# Patient Record
Sex: MALE | Race: OTHER | HISPANIC OR LATINO
[De-identification: names, ages, dates, MRNs, and addresses within clinical notes are randomized per-mention and may not be internally consistent; named-entity substitution may affect disease eponyms.]

---

## 2019-06-23 ENCOUNTER — TRANSCRIPTION ENCOUNTER (OUTPATIENT)
Age: 59
End: 2019-06-23

## 2019-06-24 ENCOUNTER — TRANSCRIPTION ENCOUNTER (OUTPATIENT)
Age: 59
End: 2019-06-24

## 2019-06-24 ENCOUNTER — INPATIENT (INPATIENT)
Facility: HOSPITAL | Age: 59
LOS: 0 days | Discharge: HOME | DRG: 661 | End: 2019-06-24
Attending: UROLOGY | Admitting: UROLOGY
Payer: SELF-PAY

## 2019-06-24 VITALS
RESPIRATION RATE: 16 BRPM | SYSTOLIC BLOOD PRESSURE: 124 MMHG | DIASTOLIC BLOOD PRESSURE: 65 MMHG | HEART RATE: 66 BPM | TEMPERATURE: 98 F | OXYGEN SATURATION: 99 %

## 2019-06-24 VITALS
TEMPERATURE: 98 F | SYSTOLIC BLOOD PRESSURE: 169 MMHG | DIASTOLIC BLOOD PRESSURE: 98 MMHG | OXYGEN SATURATION: 98 % | HEART RATE: 68 BPM | WEIGHT: 173.06 LBS | RESPIRATION RATE: 18 BRPM

## 2019-06-24 DIAGNOSIS — N13.2 HYDRONEPHROSIS WITH RENAL AND URETERAL CALCULOUS OBSTRUCTION: ICD-10-CM

## 2019-06-24 LAB
ALBUMIN SERPL ELPH-MCNC: 4.3 G/DL — SIGNIFICANT CHANGE UP (ref 3.5–5)
ALP SERPL-CCNC: 67 U/L — SIGNIFICANT CHANGE UP (ref 40–120)
ALT FLD-CCNC: 39 U/L DA — SIGNIFICANT CHANGE UP (ref 10–60)
ANION GAP SERPL CALC-SCNC: 6 MMOL/L — SIGNIFICANT CHANGE UP (ref 5–17)
APPEARANCE UR: CLEAR — SIGNIFICANT CHANGE UP
AST SERPL-CCNC: 26 U/L — SIGNIFICANT CHANGE UP (ref 10–40)
BASOPHILS # BLD AUTO: 0.03 K/UL — SIGNIFICANT CHANGE UP (ref 0–0.2)
BASOPHILS NFR BLD AUTO: 0.2 % — SIGNIFICANT CHANGE UP (ref 0–2)
BILIRUB SERPL-MCNC: 0.5 MG/DL — SIGNIFICANT CHANGE UP (ref 0.2–1.2)
BILIRUB UR-MCNC: NEGATIVE — SIGNIFICANT CHANGE UP
BLD GP AB SCN SERPL QL: SIGNIFICANT CHANGE UP
BUN SERPL-MCNC: 27 MG/DL — HIGH (ref 7–18)
CALCIUM SERPL-MCNC: 8.6 MG/DL — SIGNIFICANT CHANGE UP (ref 8.4–10.5)
CHLORIDE SERPL-SCNC: 107 MMOL/L — SIGNIFICANT CHANGE UP (ref 96–108)
CO2 SERPL-SCNC: 29 MMOL/L — SIGNIFICANT CHANGE UP (ref 22–31)
COLOR SPEC: YELLOW — SIGNIFICANT CHANGE UP
CREAT SERPL-MCNC: 1.22 MG/DL — SIGNIFICANT CHANGE UP (ref 0.5–1.3)
DIFF PNL FLD: NEGATIVE — SIGNIFICANT CHANGE UP
EOSINOPHIL # BLD AUTO: 0.02 K/UL — SIGNIFICANT CHANGE UP (ref 0–0.5)
EOSINOPHIL NFR BLD AUTO: 0.2 % — SIGNIFICANT CHANGE UP (ref 0–6)
GLUCOSE SERPL-MCNC: 144 MG/DL — HIGH (ref 70–99)
GLUCOSE UR QL: NEGATIVE — SIGNIFICANT CHANGE UP
HCT VFR BLD CALC: 44.8 % — SIGNIFICANT CHANGE UP (ref 39–50)
HCV AB S/CO SERPL IA: 0.09 S/CO — SIGNIFICANT CHANGE UP (ref 0–0.99)
HCV AB SERPL-IMP: SIGNIFICANT CHANGE UP
HGB BLD-MCNC: 14.6 G/DL — SIGNIFICANT CHANGE UP (ref 13–17)
IMM GRANULOCYTES NFR BLD AUTO: 0.3 % — SIGNIFICANT CHANGE UP (ref 0–1.5)
INR BLD: 1.03 RATIO — SIGNIFICANT CHANGE UP (ref 0.88–1.16)
KETONES UR-MCNC: ABNORMAL
LEUKOCYTE ESTERASE UR-ACNC: NEGATIVE — SIGNIFICANT CHANGE UP
LIDOCAIN IGE QN: 224 U/L — SIGNIFICANT CHANGE UP (ref 73–393)
LYMPHOCYTES # BLD AUTO: 0.93 K/UL — LOW (ref 1–3.3)
LYMPHOCYTES # BLD AUTO: 7.2 % — LOW (ref 13–44)
MCHC RBC-ENTMCNC: 32.1 PG — SIGNIFICANT CHANGE UP (ref 27–34)
MCHC RBC-ENTMCNC: 32.6 GM/DL — SIGNIFICANT CHANGE UP (ref 32–36)
MCV RBC AUTO: 98.5 FL — SIGNIFICANT CHANGE UP (ref 80–100)
MONOCYTES # BLD AUTO: 0.67 K/UL — SIGNIFICANT CHANGE UP (ref 0–0.9)
MONOCYTES NFR BLD AUTO: 5.2 % — SIGNIFICANT CHANGE UP (ref 2–14)
NEUTROPHILS # BLD AUTO: 11.18 K/UL — HIGH (ref 1.8–7.4)
NEUTROPHILS NFR BLD AUTO: 86.9 % — HIGH (ref 43–77)
NITRITE UR-MCNC: NEGATIVE — SIGNIFICANT CHANGE UP
NRBC # BLD: 0 /100 WBCS — SIGNIFICANT CHANGE UP (ref 0–0)
PH UR: 7 — SIGNIFICANT CHANGE UP (ref 5–8)
PLATELET # BLD AUTO: 266 K/UL — SIGNIFICANT CHANGE UP (ref 150–400)
POTASSIUM SERPL-MCNC: 4 MMOL/L — SIGNIFICANT CHANGE UP (ref 3.5–5.3)
POTASSIUM SERPL-SCNC: 4 MMOL/L — SIGNIFICANT CHANGE UP (ref 3.5–5.3)
PROT SERPL-MCNC: 7.9 G/DL — SIGNIFICANT CHANGE UP (ref 6–8.3)
PROT UR-MCNC: NEGATIVE — SIGNIFICANT CHANGE UP
PROTHROM AB SERPL-ACNC: 11.4 SEC — SIGNIFICANT CHANGE UP (ref 10–12.9)
RBC # BLD: 4.55 M/UL — SIGNIFICANT CHANGE UP (ref 4.2–5.8)
RBC # FLD: 11.7 % — SIGNIFICANT CHANGE UP (ref 10.3–14.5)
SODIUM SERPL-SCNC: 142 MMOL/L — SIGNIFICANT CHANGE UP (ref 135–145)
SP GR SPEC: 1.01 — SIGNIFICANT CHANGE UP (ref 1.01–1.02)
UROBILINOGEN FLD QL: NEGATIVE — SIGNIFICANT CHANGE UP
WBC # BLD: 12.87 K/UL — HIGH (ref 3.8–10.5)
WBC # FLD AUTO: 12.87 K/UL — HIGH (ref 3.8–10.5)

## 2019-06-24 PROCEDURE — 81003 URINALYSIS AUTO W/O SCOPE: CPT

## 2019-06-24 PROCEDURE — 76000 FLUOROSCOPY <1 HR PHYS/QHP: CPT

## 2019-06-24 PROCEDURE — 96375 TX/PRO/DX INJ NEW DRUG ADDON: CPT | Mod: 76

## 2019-06-24 PROCEDURE — 85027 COMPLETE CBC AUTOMATED: CPT

## 2019-06-24 PROCEDURE — 99285 EMERGENCY DEPT VISIT HI MDM: CPT | Mod: 25

## 2019-06-24 PROCEDURE — 86900 BLOOD TYPING SEROLOGIC ABO: CPT

## 2019-06-24 PROCEDURE — 86901 BLOOD TYPING SEROLOGIC RH(D): CPT

## 2019-06-24 PROCEDURE — 36415 COLL VENOUS BLD VENIPUNCTURE: CPT

## 2019-06-24 PROCEDURE — 74176 CT ABD & PELVIS W/O CONTRAST: CPT

## 2019-06-24 PROCEDURE — C2617: CPT

## 2019-06-24 PROCEDURE — 93005 ELECTROCARDIOGRAM TRACING: CPT

## 2019-06-24 PROCEDURE — 86803 HEPATITIS C AB TEST: CPT

## 2019-06-24 PROCEDURE — 87086 URINE CULTURE/COLONY COUNT: CPT

## 2019-06-24 PROCEDURE — 99223 1ST HOSP IP/OBS HIGH 75: CPT | Mod: 25

## 2019-06-24 PROCEDURE — 96374 THER/PROPH/DIAG INJ IV PUSH: CPT

## 2019-06-24 PROCEDURE — 74420 UROGRAPHY RTRGR +-KUB: CPT | Mod: 26

## 2019-06-24 PROCEDURE — 83690 ASSAY OF LIPASE: CPT

## 2019-06-24 PROCEDURE — 74176 CT ABD & PELVIS W/O CONTRAST: CPT | Mod: 26

## 2019-06-24 PROCEDURE — 52332 CYSTOSCOPY AND TREATMENT: CPT | Mod: RT

## 2019-06-24 PROCEDURE — 86850 RBC ANTIBODY SCREEN: CPT

## 2019-06-24 PROCEDURE — 85610 PROTHROMBIN TIME: CPT

## 2019-06-24 PROCEDURE — 80053 COMPREHEN METABOLIC PANEL: CPT

## 2019-06-24 PROCEDURE — 99053 MED SERV 10PM-8AM 24 HR FAC: CPT

## 2019-06-24 RX ORDER — HEPARIN SODIUM 5000 [USP'U]/ML
5000 INJECTION INTRAVENOUS; SUBCUTANEOUS EVERY 8 HOURS
Refills: 0 | Status: DISCONTINUED | OUTPATIENT
Start: 2019-06-24 | End: 2019-06-24

## 2019-06-24 RX ORDER — CEFTRIAXONE 500 MG/1
1000 INJECTION, POWDER, FOR SOLUTION INTRAMUSCULAR; INTRAVENOUS ONCE
Refills: 0 | Status: COMPLETED | OUTPATIENT
Start: 2019-06-24 | End: 2019-06-24

## 2019-06-24 RX ORDER — KETOROLAC TROMETHAMINE 30 MG/ML
30 SYRINGE (ML) INJECTION ONCE
Refills: 0 | Status: DISCONTINUED | OUTPATIENT
Start: 2019-06-24 | End: 2019-06-24

## 2019-06-24 RX ORDER — SODIUM CHLORIDE 9 MG/ML
1000 INJECTION INTRAMUSCULAR; INTRAVENOUS; SUBCUTANEOUS ONCE
Refills: 0 | Status: COMPLETED | OUTPATIENT
Start: 2019-06-24 | End: 2019-06-24

## 2019-06-24 RX ORDER — ACETAMINOPHEN 500 MG
1000 TABLET ORAL ONCE
Refills: 0 | Status: DISCONTINUED | OUTPATIENT
Start: 2019-06-24 | End: 2019-06-24

## 2019-06-24 RX ORDER — TAMSULOSIN HYDROCHLORIDE 0.4 MG/1
1 CAPSULE ORAL
Qty: 30 | Refills: 0
Start: 2019-06-24 | End: 2019-07-23

## 2019-06-24 RX ORDER — FENTANYL CITRATE 50 UG/ML
25 INJECTION INTRAVENOUS
Refills: 0 | Status: DISCONTINUED | OUTPATIENT
Start: 2019-06-24 | End: 2019-06-24

## 2019-06-24 RX ORDER — CHLORHEXIDINE GLUCONATE 213 G/1000ML
1 SOLUTION TOPICAL ONCE
Refills: 0 | Status: COMPLETED | OUTPATIENT
Start: 2019-06-24 | End: 2019-06-24

## 2019-06-24 RX ORDER — MORPHINE SULFATE 50 MG/1
2 CAPSULE, EXTENDED RELEASE ORAL EVERY 4 HOURS
Refills: 0 | Status: DISCONTINUED | OUTPATIENT
Start: 2019-06-24 | End: 2019-06-24

## 2019-06-24 RX ORDER — SODIUM CHLORIDE 9 MG/ML
1000 INJECTION, SOLUTION INTRAVENOUS
Refills: 0 | Status: DISCONTINUED | OUTPATIENT
Start: 2019-06-24 | End: 2019-06-24

## 2019-06-24 RX ORDER — CEFTRIAXONE 500 MG/1
1000 INJECTION, POWDER, FOR SOLUTION INTRAMUSCULAR; INTRAVENOUS EVERY 24 HOURS
Refills: 0 | Status: DISCONTINUED | OUTPATIENT
Start: 2019-06-24 | End: 2019-06-24

## 2019-06-24 RX ORDER — MOXIFLOXACIN HYDROCHLORIDE TABLETS, 400 MG 400 MG/1
1 TABLET, FILM COATED ORAL
Qty: 4 | Refills: 0
Start: 2019-06-24 | End: 2019-06-25

## 2019-06-24 RX ORDER — ONDANSETRON 8 MG/1
4 TABLET, FILM COATED ORAL EVERY 6 HOURS
Refills: 0 | Status: DISCONTINUED | OUTPATIENT
Start: 2019-06-24 | End: 2019-06-24

## 2019-06-24 RX ORDER — ONDANSETRON 8 MG/1
4 TABLET, FILM COATED ORAL ONCE
Refills: 0 | Status: COMPLETED | OUTPATIENT
Start: 2019-06-24 | End: 2019-06-24

## 2019-06-24 RX ORDER — MORPHINE SULFATE 50 MG/1
2 CAPSULE, EXTENDED RELEASE ORAL ONCE
Refills: 0 | Status: DISCONTINUED | OUTPATIENT
Start: 2019-06-24 | End: 2019-06-24

## 2019-06-24 RX ADMIN — SODIUM CHLORIDE 125 MILLILITER(S): 9 INJECTION, SOLUTION INTRAVENOUS at 09:15

## 2019-06-24 RX ADMIN — CEFTRIAXONE 1000 MILLIGRAM(S): 500 INJECTION, POWDER, FOR SOLUTION INTRAMUSCULAR; INTRAVENOUS at 08:00

## 2019-06-24 RX ADMIN — SODIUM CHLORIDE 1000 MILLILITER(S): 9 INJECTION INTRAMUSCULAR; INTRAVENOUS; SUBCUTANEOUS at 06:20

## 2019-06-24 RX ADMIN — SODIUM CHLORIDE 1000 MILLILITER(S): 9 INJECTION INTRAMUSCULAR; INTRAVENOUS; SUBCUTANEOUS at 03:56

## 2019-06-24 RX ADMIN — MORPHINE SULFATE 2 MILLIGRAM(S): 50 CAPSULE, EXTENDED RELEASE ORAL at 06:27

## 2019-06-24 RX ADMIN — ONDANSETRON 4 MILLIGRAM(S): 8 TABLET, FILM COATED ORAL at 03:56

## 2019-06-24 RX ADMIN — CHLORHEXIDINE GLUCONATE 1 APPLICATION(S): 213 SOLUTION TOPICAL at 12:22

## 2019-06-24 RX ADMIN — Medication 30 MILLIGRAM(S): at 04:42

## 2019-06-24 RX ADMIN — MORPHINE SULFATE 2 MILLIGRAM(S): 50 CAPSULE, EXTENDED RELEASE ORAL at 07:40

## 2019-06-24 RX ADMIN — SODIUM CHLORIDE 1000 MILLILITER(S): 9 INJECTION INTRAMUSCULAR; INTRAVENOUS; SUBCUTANEOUS at 05:24

## 2019-06-24 RX ADMIN — Medication 30 MILLIGRAM(S): at 03:23

## 2019-06-24 RX ADMIN — SODIUM CHLORIDE 1000 MILLILITER(S): 9 INJECTION INTRAMUSCULAR; INTRAVENOUS; SUBCUTANEOUS at 04:42

## 2019-06-24 NOTE — ED ADULT NURSE NOTE - OBJECTIVE STATEMENT
presents to the ED with  RLQ ABD pain radiating to right lower back painx 6 days .took advil last nigth with no  relief.,

## 2019-06-24 NOTE — DISCHARGE NOTE PROVIDER - NSDCCPTREATMENT_GEN_ALL_CORE_FT
PRINCIPAL PROCEDURE  Procedure: Cystoscopic insertion of right ureteral stent  Findings and Treatment:

## 2019-06-24 NOTE — H&P ADULT - NSHPPHYSICALEXAM_GEN_ALL_CORE
NAD  alert, oriented x3  S1S2  abd soft, NT,ND  mild R CVA tenderness(pt received morphine) NAD  alert, oriented x3  cv no resp distress  S1S2  abd soft, NT,ND  mild R CVA tenderness(pt received morphine)  britt   no edema

## 2019-06-24 NOTE — ED ADULT NURSE NOTE - NSIMPLEMENTINTERV_GEN_ALL_ED
Implemented All Universal Safety Interventions:  [place] to call system. Call bell, personal items and telephone within reach. Instruct patient to call for assistance. Room bathroom lighting operational. Non-slip footwear when patient is off stretcher. Physically safe environment: no spills, clutter or unnecessary equipment. Stretcher in lowest position, wheels locked, appropriate side rails in place.

## 2019-06-24 NOTE — DISCHARGE NOTE PROVIDER - NSDCCPCAREPLAN_GEN_ALL_CORE_FT
PRINCIPAL DISCHARGE DIAGNOSIS  Diagnosis: Ureteral stone with hydronephrosis  Assessment and Plan of Treatment: Please continue Cipro every 12 hours for the next 2 days. Please take Flomax nightly. Follow up with Dr. Lynn later this week, please call his office to schedule an appointment. Please call if any difficulty voiding or unable to pass urine.

## 2019-06-24 NOTE — DISCHARGE NOTE PROVIDER - CARE PROVIDER_API CALL
Jp Lynn (MD)  Urology  [address], [address]  [place] [address]  Phone: [phone]  Fax: [phone]  Follow Up Time:

## 2019-06-24 NOTE — DISCHARGE NOTE PROVIDER - HOSPITAL COURSE
59 y/o male denies sig medical history or PSH c/o R flank pain gradually worsening since Tues; pain only controlled with morphine in ED per pt flank pain radiates to R groin with n/v; pain worsened yesterday no f/c/dysuria or hematuria. Found to have Mild right hydronephrosis secondary to 8mm right proximal ureteral stone on CT scan. Patientwas taken to the OR on 6/24 for cystoscopy and insertion of right ureteral stent. Patient was cleared for discharge postoperatively.

## 2019-06-24 NOTE — ED PROVIDER NOTE - CLINICAL SUMMARY MEDICAL DECISION MAKING FREE TEXT BOX
57 y/o male with right flank pain radiating to the groin. Will check labs, UA, CT abd, and reassess. Given Toradol for pain, IV fluids, and Zofran. 57 y/o male with right flank pain radiating to the groin. Will check labs, UA, CT abd, and reassess. Given Toradol for pain, IV fluids, and Zofran.    labs show wbc 12K, Cr wnl, awaiting US  CT shows 8mm R proximal ureteral stone  discussed above with Dr. Lynn who recommends evaluation by surgical house officer who evaluated patient and recommend admission

## 2019-06-25 LAB
CULTURE RESULTS: NO GROWTH — SIGNIFICANT CHANGE UP
SPECIMEN SOURCE: SIGNIFICANT CHANGE UP

## 2019-07-05 PROBLEM — Z78.9 OTHER SPECIFIED HEALTH STATUS: Chronic | Status: ACTIVE | Noted: 2019-06-24

## 2019-07-09 PROBLEM — Z00.00 ENCOUNTER FOR PREVENTIVE HEALTH EXAMINATION: Status: ACTIVE | Noted: 2019-07-09

## 2019-07-10 ENCOUNTER — APPOINTMENT (OUTPATIENT)
Dept: UROLOGY | Facility: CLINIC | Age: 59
End: 2019-07-10
Payer: SELF-PAY

## 2019-07-10 VITALS
SYSTOLIC BLOOD PRESSURE: 131 MMHG | WEIGHT: 172 LBS | HEIGHT: 70 IN | DIASTOLIC BLOOD PRESSURE: 87 MMHG | BODY MASS INDEX: 24.62 KG/M2 | HEART RATE: 101 BPM

## 2019-07-10 DIAGNOSIS — N20.0 CALCULUS OF KIDNEY: ICD-10-CM

## 2019-07-10 PROCEDURE — 99214 OFFICE O/P EST MOD 30 MIN: CPT

## 2019-07-10 RX ORDER — CIPROFLOXACIN HYDROCHLORIDE 750 MG/1
TABLET, FILM COATED ORAL
Refills: 0 | Status: ACTIVE | COMMUNITY

## 2019-07-10 RX ORDER — TAMSULOSIN HYDROCHLORIDE 0.4 MG/1
0.4 CAPSULE ORAL
Refills: 0 | Status: ACTIVE | COMMUNITY

## 2019-07-10 NOTE — REVIEW OF SYSTEMS
[History of kidney stones] : history of kidney stones [see HPI] : see HPI [Wake up at night to urinate  How many times?  ___] : wakes up to urinate [unfilled] times during the night [Negative] : Heme/Lymph

## 2019-07-15 NOTE — ASSESSMENT
[FreeTextEntry1] : reviewed ct with pt \par advise urs stent exchange\par will schedule urs \par risk of bleeding infection damage to ureter \par postop stent reviewed\par reports he currently has no insurance will call whn arranges \par advised importance of following up i timely manner

## 2019-07-15 NOTE — HISTORY OF PRESENT ILLNESS
[FreeTextEntry1] : cc f/u ureteral stent/stone\par 57 yo male admitted to [facility] with ureteral stone s/p stent \par feel well\par voding well\par no fever \par \par no flank pain \par first stone episode that he knows \par no fam hx

## 2020-03-18 ENCOUNTER — APPOINTMENT (OUTPATIENT)
Dept: UROLOGY | Facility: CLINIC | Age: 60
End: 2020-03-18

## 2020-03-25 ENCOUNTER — APPOINTMENT (OUTPATIENT)
Dept: UROLOGY | Facility: CLINIC | Age: 60
End: 2020-03-25

## 2020-07-08 ENCOUNTER — APPOINTMENT (OUTPATIENT)
Dept: UROLOGY | Facility: CLINIC | Age: 60
End: 2020-07-08
Payer: COMMERCIAL

## 2020-07-08 VITALS — HEART RATE: 82 BPM | TEMPERATURE: 98.2 F | DIASTOLIC BLOOD PRESSURE: 101 MMHG | SYSTOLIC BLOOD PRESSURE: 156 MMHG

## 2020-07-08 PROCEDURE — 99214 OFFICE O/P EST MOD 30 MIN: CPT

## 2020-07-08 NOTE — HISTORY OF PRESENT ILLNESS
[FreeTextEntry1] : cc f/u ureteral stent/stone\par 57 yo male admitted to [facility] with ureteral stone s/p stent 3/19\par feel well\par voding well\par no fever \par deferred treatment due to insurance reasons \par \par no flank pain \par first stone episode that he knows \par no fam hx

## 2020-07-08 NOTE — ASSESSMENT
[FreeTextEntry1] : stent has been in for 1 year \par pt advised of risk of stentencrustation\par will get ct to better eval stone burden \par advised may need pcnl if large proximal stone burden

## 2020-09-10 ENCOUNTER — APPOINTMENT (OUTPATIENT)
Dept: UROLOGY | Facility: CLINIC | Age: 60
End: 2020-09-10
Payer: COMMERCIAL

## 2020-09-10 PROCEDURE — 99214 OFFICE O/P EST MOD 30 MIN: CPT

## 2020-09-10 NOTE — HISTORY OF PRESENT ILLNESS
[FreeTextEntry1] : cc f/u ureteral stent/stone\par 59 yo male admitted to [facility] with ureteral stone s/p stent 3/19\par feel well\par voding well\par no fever \par deferred treatment due to insurance reasons \par f/u ct \par \par ct shows stent in place but with hydro \par multiple stones both kidney

## 2020-09-10 NOTE — ASSESSMENT
[FreeTextEntry1] : reviewed ct images with pt \par no significant stone attached to coils but likely encrusted especially given hydro \par reviewed options pcnl vs urs \par risks and benefits reviewed\par reviewed risk of damage to ureter with removal of stent \par will schedule urs

## 2020-10-21 DIAGNOSIS — Z01.818 ENCOUNTER FOR OTHER PREPROCEDURAL EXAMINATION: ICD-10-CM

## 2020-10-22 ENCOUNTER — OUTPATIENT (OUTPATIENT)
Dept: OUTPATIENT SERVICES | Facility: HOSPITAL | Age: 60
LOS: 1 days | End: 2020-10-22
Payer: COMMERCIAL

## 2020-10-22 VITALS
WEIGHT: 184.97 LBS | HEIGHT: 69 IN | OXYGEN SATURATION: 98 % | TEMPERATURE: 98 F | HEART RATE: 82 BPM | RESPIRATION RATE: 16 BRPM

## 2020-10-22 DIAGNOSIS — Z98.890 OTHER SPECIFIED POSTPROCEDURAL STATES: Chronic | ICD-10-CM

## 2020-10-22 DIAGNOSIS — N20.9 URINARY CALCULUS, UNSPECIFIED: ICD-10-CM

## 2020-10-22 LAB
APPEARANCE UR: CLEAR — SIGNIFICANT CHANGE UP
BILIRUB UR-MCNC: NEGATIVE — SIGNIFICANT CHANGE UP
COLOR SPEC: YELLOW — SIGNIFICANT CHANGE UP
COMMENT - URINE: SIGNIFICANT CHANGE UP
DIFF PNL FLD: ABNORMAL
EPI CELLS # UR: ABNORMAL /HPF
GLUCOSE UR QL: NEGATIVE — SIGNIFICANT CHANGE UP
KETONES UR-MCNC: NEGATIVE — SIGNIFICANT CHANGE UP
LEUKOCYTE ESTERASE UR-ACNC: ABNORMAL
NITRITE UR-MCNC: NEGATIVE — SIGNIFICANT CHANGE UP
PH UR: 6 — SIGNIFICANT CHANGE UP (ref 5–8)
PROT UR-MCNC: 30 MG/DL
RBC CASTS # UR COMP ASSIST: ABNORMAL /HPF (ref 0–2)
SP GR SPEC: 1.01 — SIGNIFICANT CHANGE UP (ref 1.01–1.02)
UROBILINOGEN FLD QL: NEGATIVE — SIGNIFICANT CHANGE UP
WBC UR QL: ABNORMAL /HPF (ref 0–5)

## 2020-10-22 PROCEDURE — G0463: CPT

## 2020-10-22 RX ORDER — SODIUM CHLORIDE 9 MG/ML
3 INJECTION INTRAMUSCULAR; INTRAVENOUS; SUBCUTANEOUS EVERY 8 HOURS
Refills: 0 | Status: DISCONTINUED | OUTPATIENT
Start: 2020-10-26 | End: 2020-10-26

## 2020-10-22 NOTE — H&P PST ADULT - RS GEN PE MLT RESP DETAILS PC
no subcutaneous emphysema/clear to auscultation bilaterally/no intercostal retractions/no rales/normal/airway patent/no chest wall tenderness/breath sounds equal/no rhonchi/no wheezes/respirations non-labored/good air movement

## 2020-10-22 NOTE — H&P PST ADULT - NEGATIVE GENERAL SYMPTOMS
no malaise/no fever/no chills/no weight gain/no polyuria/no sweating/no anorexia/no weight loss/no polyphagia/no polydipsia/no fatigue

## 2020-10-22 NOTE — H&P PST ADULT - MUSCULOSKELETAL
negative No joint pain, swelling or deformity; no limitation of movement details… detailed exam LLE/diminished strength

## 2020-10-22 NOTE — H&P PST ADULT - NEGATIVE CARDIOVASCULAR SYMPTOMS
no peripheral edema/no dyspnea on exertion/no orthopnea/no palpitations/no claudication/no chest pain/no paroxysmal nocturnal dyspnea

## 2020-10-22 NOTE — H&P PST ADULT - MUSCULOSKELETAL COMMENTS
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

## 2020-10-22 NOTE — H&P PST ADULT - NSICDXPASTSURGICALHX_GEN_ALL_CORE_FT
No significant past surgical history PAST SURGICAL HISTORY:  History of removal of cyst from left 2nd metacarpal    History of tonsillectomy and adenoidectomy

## 2020-10-22 NOTE — H&P PST ADULT - NSICDXPROBLEM_GEN_ALL_CORE_FT
PROBLEM DIAGNOSES  Problem: Urinary calculus  Assessment and Plan:  Scheduled fo cystoscopy, right ureteroscopy, Laser Lithotripsy and Exchange of Stent 10/26/2020. Preoperative instructions discussed and given to patient. Verbalized understanding of instructions

## 2020-10-23 ENCOUNTER — APPOINTMENT (OUTPATIENT)
Dept: DISASTER EMERGENCY | Facility: CLINIC | Age: 60
End: 2020-10-23

## 2020-10-23 LAB
CULTURE RESULTS: SIGNIFICANT CHANGE UP
SPECIMEN SOURCE: SIGNIFICANT CHANGE UP

## 2020-10-24 LAB — SARS-COV-2 N GENE NPH QL NAA+PROBE: NOT DETECTED

## 2020-10-25 ENCOUNTER — TRANSCRIPTION ENCOUNTER (OUTPATIENT)
Age: 60
End: 2020-10-25

## 2020-10-26 ENCOUNTER — OUTPATIENT (OUTPATIENT)
Dept: OUTPATIENT SERVICES | Facility: HOSPITAL | Age: 60
LOS: 1 days | End: 2020-10-26
Payer: COMMERCIAL

## 2020-10-26 ENCOUNTER — APPOINTMENT (OUTPATIENT)
Dept: UROLOGY | Facility: HOSPITAL | Age: 60
End: 2020-10-26

## 2020-10-26 ENCOUNTER — RESULT REVIEW (OUTPATIENT)
Age: 60
End: 2020-10-26

## 2020-10-26 VITALS
RESPIRATION RATE: 18 BRPM | WEIGHT: 184.97 LBS | TEMPERATURE: 98 F | DIASTOLIC BLOOD PRESSURE: 92 MMHG | OXYGEN SATURATION: 97 % | HEIGHT: 69 IN | HEART RATE: 79 BPM | SYSTOLIC BLOOD PRESSURE: 143 MMHG

## 2020-10-26 VITALS
TEMPERATURE: 98 F | DIASTOLIC BLOOD PRESSURE: 82 MMHG | SYSTOLIC BLOOD PRESSURE: 136 MMHG | RESPIRATION RATE: 16 BRPM | HEART RATE: 65 BPM | OXYGEN SATURATION: 97 %

## 2020-10-26 DIAGNOSIS — Z98.890 OTHER SPECIFIED POSTPROCEDURAL STATES: Chronic | ICD-10-CM

## 2020-10-26 DIAGNOSIS — N20.0 CALCULUS OF KIDNEY: ICD-10-CM

## 2020-10-26 DIAGNOSIS — N20.9 URINARY CALCULUS, UNSPECIFIED: ICD-10-CM

## 2020-10-26 PROCEDURE — C2617: CPT

## 2020-10-26 PROCEDURE — 88300 SURGICAL PATH GROSS: CPT | Mod: 26

## 2020-10-26 PROCEDURE — C1758: CPT

## 2020-10-26 PROCEDURE — C1889: CPT

## 2020-10-26 PROCEDURE — 88300 SURGICAL PATH GROSS: CPT

## 2020-10-26 PROCEDURE — 76000 FLUOROSCOPY <1 HR PHYS/QHP: CPT

## 2020-10-26 PROCEDURE — 52356 CYSTO/URETERO W/LITHOTRIPSY: CPT | Mod: RT

## 2020-10-26 PROCEDURE — 74420 UROGRAPHY RTRGR +-KUB: CPT | Mod: 26

## 2020-10-26 PROCEDURE — 82365 CALCULUS SPECTROSCOPY: CPT

## 2020-10-26 RX ORDER — AZTREONAM 2 G
1 VIAL (EA) INJECTION
Qty: 6 | Refills: 0
Start: 2020-10-26 | End: 2020-10-28

## 2020-10-26 RX ORDER — FENTANYL CITRATE 50 UG/ML
25 INJECTION INTRAVENOUS
Refills: 0 | Status: DISCONTINUED | OUTPATIENT
Start: 2020-10-26 | End: 2020-10-27

## 2020-10-26 RX ORDER — ACETAMINOPHEN 500 MG
650 TABLET ORAL EVERY 6 HOURS
Refills: 0 | Status: DISCONTINUED | OUTPATIENT
Start: 2020-10-26 | End: 2020-11-02

## 2020-10-26 RX ORDER — OXYCODONE AND ACETAMINOPHEN 5; 325 MG/1; MG/1
1 TABLET ORAL EVERY 4 HOURS
Refills: 0 | Status: DISCONTINUED | OUTPATIENT
Start: 2020-10-26 | End: 2020-10-26

## 2020-10-26 RX ORDER — OXYCODONE AND ACETAMINOPHEN 5; 325 MG/1; MG/1
1 TABLET ORAL EVERY 4 HOURS
Refills: 0 | Status: DISCONTINUED | OUTPATIENT
Start: 2020-10-26 | End: 2020-11-02

## 2020-10-26 RX ORDER — ONDANSETRON 8 MG/1
4 TABLET, FILM COATED ORAL EVERY 6 HOURS
Refills: 0 | Status: DISCONTINUED | OUTPATIENT
Start: 2020-10-26 | End: 2020-10-26

## 2020-10-26 RX ORDER — ONDANSETRON 8 MG/1
4 TABLET, FILM COATED ORAL ONCE
Refills: 0 | Status: COMPLETED | OUTPATIENT
Start: 2020-10-26 | End: 2020-10-26

## 2020-10-26 RX ORDER — ACETAMINOPHEN 500 MG
2 TABLET ORAL
Qty: 0 | Refills: 0 | DISCHARGE
Start: 2020-10-26

## 2020-10-26 RX ADMIN — Medication 650 MILLIGRAM(S): at 18:00

## 2020-10-26 RX ADMIN — Medication 650 MILLIGRAM(S): at 18:30

## 2020-10-26 RX ADMIN — ONDANSETRON 4 MILLIGRAM(S): 8 TABLET, FILM COATED ORAL at 17:55

## 2020-10-26 NOTE — ASU DISCHARGE PLAN (ADULT/PEDIATRIC) - PROVIDER TOKENS
PROVIDER:[TOKEN:[[id number]],FOLLOWUP:[1 week]] PROVIDER:[TOKEN:[[id number]],SCHEDULEDAPPT:[10/29/2020]]

## 2020-10-26 NOTE — ASU DISCHARGE PLAN (ADULT/PEDIATRIC) - CARE PROVIDER_API CALL
Jp Lynn  UROLOGY  [address], [address]  [place] [address]  Phone: [phone]  Fax: [phone]  Follow Up Time: 1 week   Jp Lynn  UROLOGY  [address], [address]  [place] [address]  Phone: [phone]  Fax: [phone]  Scheduled Appointment: 10/29/2020

## 2020-10-26 NOTE — ASU DISCHARGE PLAN (ADULT/PEDIATRIC) - B. DRINK ALCOHOL, BEER, OR WINE
Referral Request   Received:  Today   Message Contents   Freddy [facility] Clinical Staff   Cc: [facility] Referral Pool   Phone Number: [phone]             .Reason for the order/referral:ER Follow Up   PCP: Yara Choudhayr   Refer to Provider: Fabiola Whitney Statement Selected

## 2020-10-26 NOTE — ASU DISCHARGE PLAN (ADULT/PEDIATRIC) - ASU DC SPECIAL INSTRUCTIONSFT
It is common to have blood in the urine after your procedure.  It may be pink or even red; inform your doctor if you have a significant amount of clots in the urine or if you are unable to void at all.  Be sure to drink plenty of fluids at all times.    -It is not uncommon to have some burning when you urinate or to even notice some stone fragments in your urine.  -You have an internal stent (a hollow tube that runs from the kidney to your bladder) after your procedure, helping your kidney drain down to your bladder after your surgery.  Some patients do not notice that they have a stent, while others complain of the sensation of needing to urinate frequently, burning on urination, or even some back pain (especially when they go to urinate). These sensations usually improve gradually, some faster than others. This is not uncommon, but may initially warrant the use of the pain medication which you were prescribed.  While the stent is in place, your urine may continue to be bloody. This stent will be removed by your urologist as an outpatient.  -Drink at least 6-8 glasses of fluid per day unless you have a medical condition that limits your fluid intake; minimize night-time drinking if this causes you to awaken regularly to urinate.  -You may resume your regular diet and regular medication regimen.    -You may shower or bathe.    -You may take over the counter pain medications such as Tylenol, do not exceed 4 grams daily.  If you have severe pain that does not improve with the pain medication or you have persistent vomiting, call your doctor.  -You will have a prescription for an antibiotic when you go home.  Take this medication as instructed; if you miss one dose, resume taking it on your previous schedule until you have completed the entire course of treatment.  -As you have just underwent general anesthesia, you should refrain from driving, heavy lifting, smoking, alcohol consumption, or important decision making for the next 24 hours.  You may climb stairs and you may resume sexual activity.  -Call your physician if you have a fever over 101F.  -Make a follow up appointment for with your urologist when you arrive home (or the next business day).  -Call your urologist during normal business hours with any other routine questions.

## 2020-10-26 NOTE — BRIEF OPERATIVE NOTE - ANTIBIOTIC PROTOCOL
[facility] Progress Note    Date: December 3, 2019    Name: Henry Olivares    MRN: [id number]         : 1962    Ms. Francisca Bishop Arrived 4988 and in no distress for cycle 8 (last Cycle) . Assessment was completed, no acute issues at this time, no new complaints voiced. PORT accessed with positive blood return. Labs drawn and sent for processing. Port flushed and Is infusing well with good blood return. Chemotherapy Flowsheet 12/3/2019   Cycle C8D15   Date 12/3/2019   Drug / Regimen Taxol   Dosage -   Time Up -   Time Down -   Pre Meds given   Notes given         Ms. Garcia's vitals were reviewed. Patient Vitals for the past 12 hrs:   Temp Pulse Resp BP   19 1256 -- 85 -- 155/85   19 0918 97.6 °F (36.4 °C) 78 18 107/74         Lab results were obtained and reviewed. Recent Results (from the past 12 hour(s))   CBC WITH AUTOMATED DIFF    Collection Time: 19  9:21 AM   Result Value Ref Range    WBC 4.4 3.6 - 11.0 K/uL    RBC 3.62 (L) 3.80 - 5.20 M/uL    HGB 12.6 11.5 - 16.0 g/dL    HCT 37.9 35.0 - 47.0 %    .7 (H) 80.0 - 99.0 FL    MCH 34.8 (H) 26.0 - 34.0 PG    MCHC 33.2 30.0 - 36.5 g/dL    RDW 12.6 11.5 - 14.5 %    PLATELET 468 828 - 035 K/uL    MPV 9.8 8.9 - 12.9 FL    NRBC 0.0 0  WBC    ABSOLUTE NRBC 0.00 0.00 - 0.01 K/uL    NEUTROPHILS 58 32 - 75 %    LYMPHOCYTES 26 12 - 49 %    MONOCYTES 13 5 - 13 %    EOSINOPHILS 1 0 - 7 %    BASOPHILS 1 0 - 1 %    IMMATURE GRANULOCYTES 1 (H) 0.0 - 0.5 %    ABS. NEUTROPHILS 2.6 1.8 - 8.0 K/UL    ABS. LYMPHOCYTES 1.2 0.8 - 3.5 K/UL    ABS. MONOCYTES 0.6 0.0 - 1.0 K/UL    ABS. EOSINOPHILS 0.1 0.0 - 0.4 K/UL    ABS. BASOPHILS 0.0 0.0 - 0.1 K/UL    ABS. IMM. GRANS. 0.0 0.00 - 0.04 K/UL    DF AUTOMATED         Pre-medications  were administered as ordered and chemotherapy was initiated.   Medications Administered     0.9% sodium chloride infusion     Admin Date  2019 Action  New Bag Dose  25 mL/hr Rate  25 mL/hr Route  IntraVENous Administered By  Milad Gibson RN          dexamethasone (DECADRON) 4 mg/mL injection 10 mg     Admin Date  12/03/2019 Action  Given Dose  10 mg Route  IntraVENous Administered By  Milad Gibson RN          diphenhydrAMINE (BENADRYL) capsule 50 mg     Admin Date  12/03/2019 Action  Given Dose  50 mg Route  Oral Administered By  Milad Gibson RN          famotidine (PF) (PEPCID) 20 mg in 0.9% sodium chloride 10 mL injection     Admin Date  12/03/2019 Action  Given Dose  20 mg Route  IntraVENous Administered By  Milad Gibson RN          heparin (porcine) pf 300-500 Units     Admin Date  12/03/2019 Action  Given Dose  500 Units Route  InterCATHeter Administered By  Milad Gibson RN          PACLitaxel (TAXOL) 133 mg in 0.9% sodium chloride 250 mL, overfill volume 25 mL chemo infusion     Admin Date  12/03/2019 Action  New Bag Dose  133 mg Rate  297.2 mL/hr Route  IntraVENous Administered By  Milad Gibson RN          saline peripheral flush soln 10 mL     Admin Date  12/03/2019 Action  Given Dose  10 mL Route  InterCATHeter Administered By  Milad Gibson RN                    Ms. Reba Lau tolerated treatment well. Port maintained positive blood return throughout treatment. Port flushed, heparinized and de accessed per protocol. Patient was discharged from [place] [address] in stable condition at 1310.      Future Appointments   Date Time Provider Preeti Joyce   12/6/2019  7:30 AM [facility] MRI 2 [place] [address] H   12/9/2019 11:20 AM Kenia Porter MD [facility] SCHED   1/21/2020  9:45 AM Lady Inman NP [address] [facility], RN  December 3, 2019 Followed protocol

## 2020-10-26 NOTE — ASU PATIENT PROFILE, ADULT - NS PRO ABUSE SCREEN AFRAID ANYONE YN
-The morning after your procedure, you may take the dressing off. The easiest way to do this is when you are showering, get the tape and dressing wet and remove it.  -After the bandage is removed, cover the area with a small adhesive bandage. It is normal for the catheter insertion site to be black and blue for a couple of days. The site may also be slightly swollen and pink, and there may be a small lump (about the size of a quarter) at the site.  -Wash the catheter insertion site at least once daily with soap and water. Place soapy water on your hand or washcloth and gently wash the insertion site; do not rub.  -Keep the area clean and dry when you are not showering.  -Do not use creams, lotions or ointment on the wound site.  -Wear loose clothes and loose underwear.  -Do not take a bath, tub soak, go in a Jacuzzi, or swim in a pool or lake for one week after the procedure.  
no

## 2020-10-26 NOTE — BRIEF OPERATIVE NOTE - OPERATION/FINDINGS
Cystoscopy. Old right ureteral stent removed under fluoroscopic guidance. Right retrograde pyelogram performed, no contrast extravasation appreciated, complex collecting system. Semi-rigid ureteroscopy performed, no fragments appreciated within ureter. Right flexible ureteroscopy performed over access sheath, stone identified in lower po;e, fragmented with laser lithotripsy, extracted via basket. No residual large fragments identified on systematic pyeloscopy or within ureter. 6x26cm ureteral stent placed.

## 2020-10-26 NOTE — ASU PATIENT PROFILE, ADULT - PSH
History of removal of cyst  from left 2nd metacarpal  History of tonsillectomy and adenoidectomy

## 2020-10-26 NOTE — BRIEF OPERATIVE NOTE - NSICDXBRIEFPROCEDURE_GEN_ALL_CORE_FT
PROCEDURES:  Cystoscopy, w retrograde pyelogram, ureteroscopy, urinary calculus laser lithotripsy, + stent insert 26-Oct-2020 15:59:10 Right stent exchange Marge Machuca

## 2021-03-26 ENCOUNTER — APPOINTMENT (OUTPATIENT)
Dept: UROLOGY | Facility: CLINIC | Age: 61
End: 2021-03-26
Payer: COMMERCIAL

## 2021-03-26 PROCEDURE — 99072 ADDL SUPL MATRL&STAF TM PHE: CPT

## 2021-03-26 PROCEDURE — 52310 CYSTOSCOPY AND TREATMENT: CPT

## 2021-05-05 ENCOUNTER — APPOINTMENT (OUTPATIENT)
Dept: UROLOGY | Facility: CLINIC | Age: 61
End: 2021-05-05

## 2022-08-18 NOTE — H&P PST ADULT - HEIGHT IN INCHES
Procedure explained and consent obtained by Dr. Skinner. Patient arrived to Cath lab room 1 via bed for bilateral nephrostomy tube placement. Time out performed- Pt positioned on Cath Lab table-      Pt received Versed 6 mg, Fentanyl 150 mcg  IV-  VS monitored for duration of procedure and maintained stable. Report called to Naila - Pt transported back to room 204 via bed in stable condition.   9

## 2024-01-12 NOTE — ED PROVIDER NOTE - OBJECTIVE STATEMENT
57 y/o male with no significant PMHx presents to the ED with c/o intermittent right lower back pain for the past week. The pain was mild at first but has been worse with certain positions since 2 days ago. Since 10PM tonight, the pain has been significantly worsening with associated radiation to the right groin and several episodes of vomiting. Pt also notes intermittent chills and inability to urinate since this afternoon. Pt denies any fever, hematuria, dysuria, Hx of abd surgeries, Hx or FHx of kidney stones, or other complaints. Pt states that the pain has currently improved.
07-Jan-2024 08:55

## 2024-01-24 ENCOUNTER — EMERGENCY (EMERGENCY)
Facility: HOSPITAL | Age: 64
LOS: 1 days | Discharge: HOME | End: 2024-01-24
Attending: EMERGENCY MEDICINE
Payer: COMMERCIAL

## 2024-01-24 VITALS
RESPIRATION RATE: 16 BRPM | HEART RATE: 64 BPM | DIASTOLIC BLOOD PRESSURE: 89 MMHG | WEIGHT: 184.97 LBS | HEIGHT: 69 IN | OXYGEN SATURATION: 97 % | TEMPERATURE: 98 F | SYSTOLIC BLOOD PRESSURE: 171 MMHG

## 2024-01-24 VITALS
DIASTOLIC BLOOD PRESSURE: 79 MMHG | TEMPERATURE: 98 F | OXYGEN SATURATION: 98 % | RESPIRATION RATE: 17 BRPM | SYSTOLIC BLOOD PRESSURE: 166 MMHG | HEART RATE: 62 BPM

## 2024-01-24 DIAGNOSIS — Z98.890 OTHER SPECIFIED POSTPROCEDURAL STATES: Chronic | ICD-10-CM

## 2024-01-24 LAB
ALBUMIN SERPL ELPH-MCNC: 3.9 G/DL — SIGNIFICANT CHANGE UP (ref 3.5–5)
ALP SERPL-CCNC: 68 U/L — SIGNIFICANT CHANGE UP (ref 40–120)
ALT FLD-CCNC: 38 U/L DA — SIGNIFICANT CHANGE UP (ref 10–60)
ANION GAP SERPL CALC-SCNC: 2 MMOL/L — LOW (ref 5–17)
APPEARANCE UR: CLEAR — SIGNIFICANT CHANGE UP
AST SERPL-CCNC: 29 U/L — SIGNIFICANT CHANGE UP (ref 10–40)
BASOPHILS # BLD AUTO: 0.03 K/UL — SIGNIFICANT CHANGE UP (ref 0–0.2)
BASOPHILS NFR BLD AUTO: 0.4 % — SIGNIFICANT CHANGE UP (ref 0–2)
BILIRUB SERPL-MCNC: 0.5 MG/DL — SIGNIFICANT CHANGE UP (ref 0.2–1.2)
BILIRUB UR-MCNC: NEGATIVE — SIGNIFICANT CHANGE UP
BUN SERPL-MCNC: 17 MG/DL — SIGNIFICANT CHANGE UP (ref 7–18)
CALCIUM SERPL-MCNC: 9.2 MG/DL — SIGNIFICANT CHANGE UP (ref 8.4–10.5)
CHLORIDE SERPL-SCNC: 105 MMOL/L — SIGNIFICANT CHANGE UP (ref 96–108)
CO2 SERPL-SCNC: 30 MMOL/L — SIGNIFICANT CHANGE UP (ref 22–31)
COLOR SPEC: YELLOW — SIGNIFICANT CHANGE UP
CREAT SERPL-MCNC: 1 MG/DL — SIGNIFICANT CHANGE UP (ref 0.5–1.3)
DIFF PNL FLD: NEGATIVE — SIGNIFICANT CHANGE UP
EGFR: 85 ML/MIN/1.73M2 — SIGNIFICANT CHANGE UP
EOSINOPHIL # BLD AUTO: 0.09 K/UL — SIGNIFICANT CHANGE UP (ref 0–0.5)
EOSINOPHIL NFR BLD AUTO: 1.3 % — SIGNIFICANT CHANGE UP (ref 0–6)
GLUCOSE SERPL-MCNC: 159 MG/DL — HIGH (ref 70–99)
GLUCOSE UR QL: NEGATIVE MG/DL — SIGNIFICANT CHANGE UP
HCT VFR BLD CALC: 43.4 % — SIGNIFICANT CHANGE UP (ref 39–50)
HGB BLD-MCNC: 14.9 G/DL — SIGNIFICANT CHANGE UP (ref 13–17)
IMM GRANULOCYTES NFR BLD AUTO: 0.4 % — SIGNIFICANT CHANGE UP (ref 0–0.9)
KETONES UR-MCNC: NEGATIVE MG/DL — SIGNIFICANT CHANGE UP
LEUKOCYTE ESTERASE UR-ACNC: NEGATIVE — SIGNIFICANT CHANGE UP
LIDOCAIN IGE QN: 135 U/L — HIGH (ref 13–75)
LYMPHOCYTES # BLD AUTO: 1.23 K/UL — SIGNIFICANT CHANGE UP (ref 1–3.3)
LYMPHOCYTES # BLD AUTO: 17.3 % — SIGNIFICANT CHANGE UP (ref 13–44)
MCHC RBC-ENTMCNC: 32.6 PG — SIGNIFICANT CHANGE UP (ref 27–34)
MCHC RBC-ENTMCNC: 34.3 GM/DL — SIGNIFICANT CHANGE UP (ref 32–36)
MCV RBC AUTO: 95 FL — SIGNIFICANT CHANGE UP (ref 80–100)
MONOCYTES # BLD AUTO: 0.54 K/UL — SIGNIFICANT CHANGE UP (ref 0–0.9)
MONOCYTES NFR BLD AUTO: 7.6 % — SIGNIFICANT CHANGE UP (ref 2–14)
NEUTROPHILS # BLD AUTO: 5.17 K/UL — SIGNIFICANT CHANGE UP (ref 1.8–7.4)
NEUTROPHILS NFR BLD AUTO: 73 % — SIGNIFICANT CHANGE UP (ref 43–77)
NITRITE UR-MCNC: NEGATIVE — SIGNIFICANT CHANGE UP
NRBC # BLD: 0 /100 WBCS — SIGNIFICANT CHANGE UP (ref 0–0)
PH UR: 6 — SIGNIFICANT CHANGE UP (ref 5–8)
PLATELET # BLD AUTO: 237 K/UL — SIGNIFICANT CHANGE UP (ref 150–400)
POTASSIUM SERPL-MCNC: 4.1 MMOL/L — SIGNIFICANT CHANGE UP (ref 3.5–5.3)
POTASSIUM SERPL-SCNC: 4.1 MMOL/L — SIGNIFICANT CHANGE UP (ref 3.5–5.3)
PROT SERPL-MCNC: 7.3 G/DL — SIGNIFICANT CHANGE UP (ref 6–8.3)
PROT UR-MCNC: NEGATIVE MG/DL — SIGNIFICANT CHANGE UP
RBC # BLD: 4.57 M/UL — SIGNIFICANT CHANGE UP (ref 4.2–5.8)
RBC # FLD: 11.5 % — SIGNIFICANT CHANGE UP (ref 10.3–14.5)
SODIUM SERPL-SCNC: 137 MMOL/L — SIGNIFICANT CHANGE UP (ref 135–145)
SP GR SPEC: 1.01 — SIGNIFICANT CHANGE UP (ref 1–1.03)
UROBILINOGEN FLD QL: 0.2 MG/DL — SIGNIFICANT CHANGE UP (ref 0.2–1)
WBC # BLD: 7.09 K/UL — SIGNIFICANT CHANGE UP (ref 3.8–10.5)
WBC # FLD AUTO: 7.09 K/UL — SIGNIFICANT CHANGE UP (ref 3.8–10.5)

## 2024-01-24 PROCEDURE — 85025 COMPLETE CBC W/AUTO DIFF WBC: CPT

## 2024-01-24 PROCEDURE — 81003 URINALYSIS AUTO W/O SCOPE: CPT

## 2024-01-24 PROCEDURE — 87086 URINE CULTURE/COLONY COUNT: CPT

## 2024-01-24 PROCEDURE — 36415 COLL VENOUS BLD VENIPUNCTURE: CPT

## 2024-01-24 PROCEDURE — G1004: CPT

## 2024-01-24 PROCEDURE — 74176 CT ABD & PELVIS W/O CONTRAST: CPT | Mod: MG

## 2024-01-24 PROCEDURE — 96375 TX/PRO/DX INJ NEW DRUG ADDON: CPT

## 2024-01-24 PROCEDURE — 80053 COMPREHEN METABOLIC PANEL: CPT

## 2024-01-24 PROCEDURE — 99284 EMERGENCY DEPT VISIT MOD MDM: CPT | Mod: 25

## 2024-01-24 PROCEDURE — 83690 ASSAY OF LIPASE: CPT

## 2024-01-24 PROCEDURE — 74176 CT ABD & PELVIS W/O CONTRAST: CPT | Mod: 26,MG

## 2024-01-24 PROCEDURE — 99285 EMERGENCY DEPT VISIT HI MDM: CPT

## 2024-01-24 PROCEDURE — 96374 THER/PROPH/DIAG INJ IV PUSH: CPT

## 2024-01-24 RX ORDER — KETOROLAC TROMETHAMINE 30 MG/ML
30 SYRINGE (ML) INJECTION ONCE
Refills: 0 | Status: DISCONTINUED | OUTPATIENT
Start: 2024-01-24 | End: 2024-01-24

## 2024-01-24 RX ORDER — SODIUM CHLORIDE 9 MG/ML
1000 INJECTION INTRAMUSCULAR; INTRAVENOUS; SUBCUTANEOUS ONCE
Refills: 0 | Status: COMPLETED | OUTPATIENT
Start: 2024-01-24 | End: 2024-01-24

## 2024-01-24 RX ORDER — MORPHINE SULFATE 50 MG/1
4 CAPSULE, EXTENDED RELEASE ORAL ONCE
Refills: 0 | Status: DISCONTINUED | OUTPATIENT
Start: 2024-01-24 | End: 2024-01-24

## 2024-01-24 RX ORDER — ONDANSETRON 8 MG/1
4 TABLET, FILM COATED ORAL ONCE
Refills: 0 | Status: COMPLETED | OUTPATIENT
Start: 2024-01-24 | End: 2024-01-24

## 2024-01-24 RX ORDER — OXYCODONE AND ACETAMINOPHEN 5; 325 MG/1; MG/1
1 TABLET ORAL
Qty: 10 | Refills: 0
Start: 2024-01-24

## 2024-01-24 RX ORDER — TAMSULOSIN HYDROCHLORIDE 0.4 MG/1
1 CAPSULE ORAL
Qty: 14 | Refills: 0
Start: 2024-01-24 | End: 2024-02-06

## 2024-01-24 RX ADMIN — Medication 30 MILLIGRAM(S): at 08:27

## 2024-01-24 RX ADMIN — MORPHINE SULFATE 4 MILLIGRAM(S): 50 CAPSULE, EXTENDED RELEASE ORAL at 10:00

## 2024-01-24 RX ADMIN — ONDANSETRON 4 MILLIGRAM(S): 8 TABLET, FILM COATED ORAL at 08:27

## 2024-01-24 RX ADMIN — SODIUM CHLORIDE 1000 MILLILITER(S): 9 INJECTION INTRAMUSCULAR; INTRAVENOUS; SUBCUTANEOUS at 08:27

## 2024-01-24 NOTE — ED PROVIDER NOTE - CLINICAL SUMMARY MEDICAL DECISION MAKING FREE TEXT BOX
63-year-old male presents with left flank pain since yesterday.  Tender on exam.  Concern for likely kidney stones versus pyelonephritis versus musculoskeletal pain.  Will check labs, urinalysis, CAT scan, supportive care with fluids, Zofran, analgesia and reassess.

## 2024-01-24 NOTE — ED PROVIDER NOTE - NSICDXPASTSURGICALHX_GEN_ALL_CORE_FT
PAST SURGICAL HISTORY:  History of removal of cyst from left 2nd metacarpal    History of tonsillectomy and adenoidectomy

## 2024-01-24 NOTE — ED PROVIDER NOTE - OBJECTIVE STATEMENT
63-year-old male history of kidney stones presents to ED with left flank pain starting yesterday.  As per patient has had a kidney stone to the right in the past.  Symptoms feel similar.  Nausea with 1 episode of vomiting prior to arrival.  No fever however reporting chills.  No diarrhea no chest pain no shortness of breath

## 2024-01-24 NOTE — ED PROVIDER NOTE - CARE PROVIDER_API CALL
Jp Lynn  Urology  [address] [place] [id number]  Phone: [phone]  Fax: [phone]  Follow Up Time:

## 2024-01-24 NOTE — ED PROVIDER NOTE - PROGRESS NOTE DETAILS
Patient reassessed pain improved though has not resolved.  CAT scan shows 5 mm stone.  UA negative for infection.  Labs unremarkable will give additional dose of analgesia and DC with urology follow-up.

## 2024-01-24 NOTE — ED ADULT NURSE NOTE - DISCHARGE DATE/TIME
Discussed ears care and Hygiene  Needs Ears wash after 1 - 2 weeks   R T C as needed.   24-Jan-2024 10:23

## 2024-01-24 NOTE — ED PROVIDER NOTE - ED STEMI HIDDEN
Acute kidney injury requiring hemodialysis  Resolved  HD catheter line discontinued       Recent Labs     09/08/21  0449 09/08/21  1722 09/09/21  0445   BUN 44* 37* 32*   CREATININE 1 32* 1 25 1 28   EGFR 50 53 51 hide

## 2024-01-24 NOTE — ED PROVIDER NOTE - PATIENT PORTAL LINK FT
You can access the FollowMyHealth Patient Portal offered by [facility] by registering at the following website: [email or web address]. By joining [facility]’s FollowMyHealth portal, you will also be able to view your health information using other applications (apps) compatible with our system.

## 2024-01-25 LAB
CULTURE RESULTS: NO GROWTH — SIGNIFICANT CHANGE UP
SPECIMEN SOURCE: SIGNIFICANT CHANGE UP

## 2024-01-27 ENCOUNTER — EMERGENCY (EMERGENCY)
Facility: HOSPITAL | Age: 64
LOS: 1 days | Discharge: HOME | End: 2024-01-27
Attending: EMERGENCY MEDICINE
Payer: COMMERCIAL

## 2024-01-27 VITALS
TEMPERATURE: 98 F | OXYGEN SATURATION: 95 % | RESPIRATION RATE: 16 BRPM | WEIGHT: 182.98 LBS | HEIGHT: 69 IN | SYSTOLIC BLOOD PRESSURE: 178 MMHG | DIASTOLIC BLOOD PRESSURE: 95 MMHG | HEART RATE: 84 BPM

## 2024-01-27 VITALS
TEMPERATURE: 98 F | HEART RATE: 78 BPM | OXYGEN SATURATION: 98 % | SYSTOLIC BLOOD PRESSURE: 149 MMHG | RESPIRATION RATE: 18 BRPM | DIASTOLIC BLOOD PRESSURE: 90 MMHG

## 2024-01-27 DIAGNOSIS — Z98.890 OTHER SPECIFIED POSTPROCEDURAL STATES: Chronic | ICD-10-CM

## 2024-01-27 PROBLEM — N20.0 CALCULUS OF KIDNEY: Chronic | Status: ACTIVE | Noted: 2024-01-24

## 2024-01-27 LAB
ALBUMIN SERPL ELPH-MCNC: 3.8 G/DL — SIGNIFICANT CHANGE UP (ref 3.5–5)
ALP SERPL-CCNC: 68 U/L — SIGNIFICANT CHANGE UP (ref 40–120)
ALT FLD-CCNC: 35 U/L DA — SIGNIFICANT CHANGE UP (ref 10–60)
ANION GAP SERPL CALC-SCNC: 5 MMOL/L — SIGNIFICANT CHANGE UP (ref 5–17)
APPEARANCE UR: CLEAR — SIGNIFICANT CHANGE UP
AST SERPL-CCNC: 20 U/L — SIGNIFICANT CHANGE UP (ref 10–40)
BACTERIA # UR AUTO: ABNORMAL /HPF
BASOPHILS # BLD AUTO: 0.03 K/UL — SIGNIFICANT CHANGE UP (ref 0–0.2)
BASOPHILS NFR BLD AUTO: 0.3 % — SIGNIFICANT CHANGE UP (ref 0–2)
BILIRUB SERPL-MCNC: 0.6 MG/DL — SIGNIFICANT CHANGE UP (ref 0.2–1.2)
BILIRUB UR-MCNC: NEGATIVE — SIGNIFICANT CHANGE UP
BUN SERPL-MCNC: 18 MG/DL — SIGNIFICANT CHANGE UP (ref 7–18)
CALCIUM SERPL-MCNC: 8.9 MG/DL — SIGNIFICANT CHANGE UP (ref 8.4–10.5)
CHLORIDE SERPL-SCNC: 102 MMOL/L — SIGNIFICANT CHANGE UP (ref 96–108)
CO2 SERPL-SCNC: 30 MMOL/L — SIGNIFICANT CHANGE UP (ref 22–31)
COLOR SPEC: YELLOW — SIGNIFICANT CHANGE UP
CREAT SERPL-MCNC: 1.15 MG/DL — SIGNIFICANT CHANGE UP (ref 0.5–1.3)
DIFF PNL FLD: ABNORMAL
EGFR: 72 ML/MIN/1.73M2 — SIGNIFICANT CHANGE UP
EOSINOPHIL # BLD AUTO: 0.02 K/UL — SIGNIFICANT CHANGE UP (ref 0–0.5)
EOSINOPHIL NFR BLD AUTO: 0.2 % — SIGNIFICANT CHANGE UP (ref 0–6)
EPI CELLS # UR: SIGNIFICANT CHANGE UP
GLUCOSE SERPL-MCNC: 125 MG/DL — HIGH (ref 70–99)
GLUCOSE UR QL: NEGATIVE MG/DL — SIGNIFICANT CHANGE UP
HCT VFR BLD CALC: 43.5 % — SIGNIFICANT CHANGE UP (ref 39–50)
HGB BLD-MCNC: 14.5 G/DL — SIGNIFICANT CHANGE UP (ref 13–17)
IMM GRANULOCYTES NFR BLD AUTO: 0.4 % — SIGNIFICANT CHANGE UP (ref 0–0.9)
KETONES UR-MCNC: 15 MG/DL
LEUKOCYTE ESTERASE UR-ACNC: ABNORMAL
LYMPHOCYTES # BLD AUTO: 0.9 K/UL — LOW (ref 1–3.3)
LYMPHOCYTES # BLD AUTO: 8.2 % — LOW (ref 13–44)
MCHC RBC-ENTMCNC: 32.9 PG — SIGNIFICANT CHANGE UP (ref 27–34)
MCHC RBC-ENTMCNC: 33.3 GM/DL — SIGNIFICANT CHANGE UP (ref 32–36)
MCV RBC AUTO: 98.6 FL — SIGNIFICANT CHANGE UP (ref 80–100)
MONOCYTES # BLD AUTO: 0.83 K/UL — SIGNIFICANT CHANGE UP (ref 0–0.9)
MONOCYTES NFR BLD AUTO: 7.6 % — SIGNIFICANT CHANGE UP (ref 2–14)
NEUTROPHILS # BLD AUTO: 9.12 K/UL — HIGH (ref 1.8–7.4)
NEUTROPHILS NFR BLD AUTO: 83.3 % — HIGH (ref 43–77)
NITRITE UR-MCNC: NEGATIVE — SIGNIFICANT CHANGE UP
NRBC # BLD: 0 /100 WBCS — SIGNIFICANT CHANGE UP (ref 0–0)
PH UR: 7 — SIGNIFICANT CHANGE UP (ref 5–8)
PLATELET # BLD AUTO: 240 K/UL — SIGNIFICANT CHANGE UP (ref 150–400)
POTASSIUM SERPL-MCNC: 3.8 MMOL/L — SIGNIFICANT CHANGE UP (ref 3.5–5.3)
POTASSIUM SERPL-SCNC: 3.8 MMOL/L — SIGNIFICANT CHANGE UP (ref 3.5–5.3)
PROT SERPL-MCNC: 7.8 G/DL — SIGNIFICANT CHANGE UP (ref 6–8.3)
PROT UR-MCNC: NEGATIVE MG/DL — SIGNIFICANT CHANGE UP
RBC # BLD: 4.41 M/UL — SIGNIFICANT CHANGE UP (ref 4.2–5.8)
RBC # FLD: 11.2 % — SIGNIFICANT CHANGE UP (ref 10.3–14.5)
RBC CASTS # UR COMP ASSIST: ABNORMAL /HPF
SODIUM SERPL-SCNC: 137 MMOL/L — SIGNIFICANT CHANGE UP (ref 135–145)
SP GR SPEC: 1.01 — SIGNIFICANT CHANGE UP (ref 1–1.03)
UROBILINOGEN FLD QL: 1 MG/DL — SIGNIFICANT CHANGE UP (ref 0.2–1)
WBC # BLD: 10.94 K/UL — HIGH (ref 3.8–10.5)
WBC # FLD AUTO: 10.94 K/UL — HIGH (ref 3.8–10.5)
WBC UR QL: 2 /HPF — SIGNIFICANT CHANGE UP (ref 0–5)

## 2024-01-27 PROCEDURE — 85025 COMPLETE CBC W/AUTO DIFF WBC: CPT

## 2024-01-27 PROCEDURE — 99284 EMERGENCY DEPT VISIT MOD MDM: CPT | Mod: 25

## 2024-01-27 PROCEDURE — 80053 COMPREHEN METABOLIC PANEL: CPT

## 2024-01-27 PROCEDURE — 81001 URINALYSIS AUTO W/SCOPE: CPT

## 2024-01-27 PROCEDURE — 96374 THER/PROPH/DIAG INJ IV PUSH: CPT

## 2024-01-27 PROCEDURE — 96361 HYDRATE IV INFUSION ADD-ON: CPT

## 2024-01-27 PROCEDURE — 74176 CT ABD & PELVIS W/O CONTRAST: CPT | Mod: 26,MA

## 2024-01-27 PROCEDURE — 74176 CT ABD & PELVIS W/O CONTRAST: CPT | Mod: MA

## 2024-01-27 PROCEDURE — 99284 EMERGENCY DEPT VISIT MOD MDM: CPT

## 2024-01-27 PROCEDURE — 87086 URINE CULTURE/COLONY COUNT: CPT

## 2024-01-27 PROCEDURE — 36415 COLL VENOUS BLD VENIPUNCTURE: CPT

## 2024-01-27 RX ORDER — KETOROLAC TROMETHAMINE 30 MG/ML
15 SYRINGE (ML) INJECTION ONCE
Refills: 0 | Status: DISCONTINUED | OUTPATIENT
Start: 2024-01-27 | End: 2024-01-27

## 2024-01-27 RX ORDER — SODIUM CHLORIDE 9 MG/ML
1000 INJECTION INTRAMUSCULAR; INTRAVENOUS; SUBCUTANEOUS ONCE
Refills: 0 | Status: COMPLETED | OUTPATIENT
Start: 2024-01-27 | End: 2024-01-27

## 2024-01-27 RX ADMIN — SODIUM CHLORIDE 1000 MILLILITER(S): 9 INJECTION INTRAMUSCULAR; INTRAVENOUS; SUBCUTANEOUS at 19:28

## 2024-01-27 RX ADMIN — Medication 15 MILLIGRAM(S): at 18:28

## 2024-01-27 RX ADMIN — Medication 15 MILLIGRAM(S): at 18:58

## 2024-01-27 RX ADMIN — SODIUM CHLORIDE 1000 MILLILITER(S): 9 INJECTION INTRAMUSCULAR; INTRAVENOUS; SUBCUTANEOUS at 18:28

## 2024-01-27 NOTE — ED PROVIDER NOTE - NSFOLLOWUPINSTRUCTIONS_ED_ALL_ED_FT
1) Follow up with your doctor as discussed  2) Return to the ED immediately for new or worsening symptoms   3) Please continue to take any home medications as prescribed  4) Your test results from your ED visit were discussed with you prior to discharge  5) You were provided with a copy of your test results  6) You may take Tylenol and or Motrin for pain.    Kidney Stones  A body outline of the urinary tract with a close-up of a kidney showing kidney stones.  Kidney stones are solid, rock-like deposits that form inside of the kidneys. The kidneys are a pair of organs that make urine. A kidney stone may form in a kidney and move into other parts of the urinary tract, including the tubes that connect the kidneys to the bladder (ureters), the bladder, and the tube that carries urine out of the body (urethra). As the stone moves through these areas, it can cause intense pain and block the flow of urine.    Kidney stones are created when high levels of certain minerals are found in the urine. The stones are usually passed out of the body through urination, but in some cases, medical treatment may be needed to remove them.    What are the causes?  Kidney stones may be caused by:  A condition in which certain glands produce too much parathyroid hormone (primary hyperparathyroidism), which causes too much calcium buildup in the blood.  A buildup of uric acid crystals in the bladder (hyperuricosuria). Uric acid is a chemical that the body produces when you eat certain foods. It usually leaves the body in the urine.  Narrowing (stricture) of one or both of the ureters.  A kidney blockage that is present at birth (congenital obstruction).  Past surgery on the kidney or the ureters.  What increases the risk?  The following factors may make you more likely to develop this condition:  Having had a kidney stone in the past.  Having a family history of kidney stones.  Not drinking enough water.  Eating a diet that is high in protein, salt (sodium), or sugar.  Being overweight or obese.  What are the signs or symptoms?  Symptoms of a kidney stone may include:  Pain in the side of the abdomen, right below the ribs (flank pain). Pain usually spreads (radiates) to the groin.  Needing to urinate often or urgently.  Painful urination.  Blood in the urine (hematuria).  Nausea.  Vomiting.  Fever and chills.  How is this diagnosed?  This condition may be diagnosed based on:  Your symptoms and medical history.  A physical exam.  Blood tests.  Urine tests. These may be done before and after the stone passes out of your body through urination.  Imaging tests, such as a CT scan, abdominal X-ray, or ultrasound.  A procedure to examine the inside of the bladder (cystoscopy).  How is this treated?  Treatment for kidney stones depends on the size, location, and makeup of the stones. Kidney stones will often pass out of the body through urination. You may need to:  Increase your fluid intake to help pass the stone. In some cases, you may be given fluids through an IV and may need to be monitored in the hospital.  Take medicine for pain.  Make changes in your diet to help prevent kidney stones from coming back.  Sometimes, procedures are needed to remove a kidney stone. This may involve:  A procedure to break up kidney stones using:  A focused beam of light (laser therapy).  Shock waves (extracorporeal shock wave lithotripsy).  Surgery to remove kidney stones. This may be needed if you have severe pain or have stones that block your urinary tract.  Follow these instructions at home:  Medicines    Take over-the-counter and prescription medicines only as told by your health care provider.  Ask your health care provider if the medicine prescribed to you requires you to avoid driving or using heavy machinery.  Eating and drinking    Drink enough fluid to keep your urine pale yellow. You may be instructed to drink at least 8–10 glasses of water each day. This will help you pass the kidney stone.  If directed, change your diet. This may include:  Limiting how much sodium you eat.  Eating more fruits and vegetables.  Limiting how much animal protein you eat. Animal proteins include red meat, poultry, fish, and eggs.  Eating a normal amount of calcium (1,000–1,300 mg per day).  Follow instructions from your health care provider about eating or drinking restrictions.  General instructions    Collect urine samples as told by your health care provider. You may need to collect a urine sample:  24 hours after you pass the stone.  8–12 weeks after you pass the kidney stone, and every 6–12 months after that.  Strain your urine every time you urinate, for as long as directed. Use the strainer that your health care provider recommends.  Do not throw out the kidney stone after passing it. Keep the stone so it can be tested by your health care provider. Testing the makeup of your kidney stone may help prevent you from getting kidney stones in the future.  Keep all follow-up visits. You may need follow-up X-rays or ultrasounds to make sure that your stone has passed.  How is this prevented?  A comparison of three sample cups showing dark yellow, yellow, and pale yellow urine.  To prevent another kidney stone:  Drink enough fluid to keep your urine pale yellow. This is the best way to prevent kidney stones.  Eat a healthy diet. Follow recommendations from your health care provider about foods to avoid. Recommendations vary depending on the type of kidney stone that you have. You may be instructed to eat a low-protein diet.  Maintain a healthy weight.  Where to find more information  [employer] (NKF): www.kidney.org  [employer] (F): www.urologyhealth.org  Contact a health care provider if:  You have pain that gets worse or does not get better with medicine.  Get help right away if:  You have a fever or chills.  You develop severe pain.  You develop new abdominal pain.  You faint.  You are unable to urinate.  Summary  Kidney stones are solid, rock-like deposits that form inside of the kidneys.  Kidney stones can cause nausea, vomiting, blood in the urine, abdominal pain, and the urge to urinate often.  Treatment for kidney stones depends on the size, location, and makeup of the stones. Kidney stones will often pass out of the body through urination.  Kidney stones can be prevented by drinking enough fluids, eating a healthy diet, and maintaining a healthy weight.  This information is not intended to replace advice given to you by your health care provider. Make sure you discuss any questions you have with your health care provider.

## 2024-01-27 NOTE — ED PROVIDER NOTE - ATTENDING APP SHARED VISIT CONTRIBUTION OF CARE
seen with ACP patient has a history of left proximal ureteral stone measuring 5 mm this was found 2 days ago when patient came to the ER patient's pain is now has uncontrollable and patient returns because of pain physical exam reveals patient in moderate to severe distress with left flank pain.  Patient had a CT stone hunt that showed that the stone passed.  Patient is now completely relieved of  the pain patient to follow-up with urology agree with ACPs assessment history and physical and disposition

## 2024-01-27 NOTE — ED ADULT NURSE NOTE - NS ED NURSE IV DC DT
Despite some risk factors, the patient does not demonstrate definitive evidence of glaucoma at this time. 27-Jan-2024 22:10

## 2024-01-27 NOTE — ED PROVIDER NOTE - CLINICAL SUMMARY MEDICAL DECISION MAKING FREE TEXT BOX
63-year-old male [facility] kidney stone requiring stent presenting to emergency department for left flank pain.  Patient was seen here on January 24 diagnosed with a 5 mm left proximal ureteral calculus with mild hydro-.  Sent home with Flomax and Percocet.  Patient states he felt like pain was initially controlled however this morning pain worsened, felt nausea.  Patient also endorsing urinary retention and constipation.  Has not had a bowel movement in 3 days, tried Colace.  +passing gas.  Denies chest pain, shortness of breath, vomiting, diarrhea, fever, other complaint. PE as above, will eval for worsening hydro 2/2 stone.  Do not suspect bowel obstruction, as patient states this pain is consistent with his kidney stone pain.  Plan for labs, urine, CT, analgesia, IV fluid, reassess, dispo accordingly.  Patient has urology appointment on February 1.

## 2024-01-27 NOTE — ED PROVIDER NOTE - OBJECTIVE STATEMENT
63-year-old male [facility] kidney stone requiring stent presenting to emergency department for left flank pain.  Patient was seen here on January 24 diagnosed with a 5 mm left proximal ureteral calculus with mild hydro-.  Sent home with Flomax and Percocet.  Patient states he felt like pain was initially controlled however this morning pain worsened, felt nausea.  Patient also endorsing urinary retention and constipation.  Has not had a bowel movement in 3 days, tried Colace.  +passing gas.  Denies chest pain, shortness of breath, vomiting, diarrhea, fever, other complaint.

## 2024-01-27 NOTE — ED PROVIDER NOTE - PROGRESS NOTE DETAILS
Pain completely resolved, patient states he thinks he saw a small kidney stone while urinating prior to CAT scan.  CTAP with interval passage of left ureteral stone no residual ureteral or bladder calculi are seen.  Discussed indeterminate left renal lesion, patient has urology follow-up on February 1 and aware to speak with urology about this.  Strict return precautions given patient verbalized understanding.

## 2024-01-28 LAB
CULTURE RESULTS: SIGNIFICANT CHANGE UP
SPECIMEN SOURCE: SIGNIFICANT CHANGE UP

## 2024-02-01 ENCOUNTER — APPOINTMENT (OUTPATIENT)
Dept: UROLOGY | Facility: CLINIC | Age: 64
End: 2024-02-01
Payer: COMMERCIAL

## 2024-02-01 VITALS
HEIGHT: 70 IN | BODY MASS INDEX: 26.77 KG/M2 | SYSTOLIC BLOOD PRESSURE: 127 MMHG | HEART RATE: 82 BPM | OXYGEN SATURATION: 98 % | RESPIRATION RATE: 17 BRPM | DIASTOLIC BLOOD PRESSURE: 80 MMHG | WEIGHT: 187 LBS | TEMPERATURE: 97.9 F

## 2024-02-01 DIAGNOSIS — N20.9 URINARY CALCULUS, UNSPECIFIED: ICD-10-CM

## 2024-02-01 DIAGNOSIS — N28.1 CYST OF KIDNEY, ACQUIRED: ICD-10-CM

## 2024-02-01 PROCEDURE — 99214 OFFICE O/P EST MOD 30 MIN: CPT

## 2024-02-05 PROBLEM — N20.9 UROLITHIASIS: Status: ACTIVE | Noted: 2019-07-15

## 2024-02-05 NOTE — ASSESSMENT
[FreeTextEntry1] : 63 year old male with f/u from hospital. Reviewed cat scan images  complex cyst enalrging multiple bilateral stones nonobstructing   Discussed the management options for non-obstructing kidney stones - watch and wait vs intervention via ureteroscopic approach. Risks and benefits were discussed will revisit after eval renal lesion . Advised pt to do a 24-hour urine collection to determine why he makes stones. Also advised pt to have an MRI to visualize kidney lesion. Pt agrees.   Pt had questions about management options for kidney lesion. Discussed surgery if lesion is malignant and monitoring if it is not. willget mri to better eval

## 2024-02-05 NOTE — END OF VISIT
[FreeTextEntry4] : This note was written by Marleni Kennedy on 02/01/2024 actively solely Jp Parker M.D.   All medical record entries made by this scribe at my, Jp Parker M.D. direction and personally dictated by me on 02/01/2024. I have personally reviewed the chart and agree that the record reflects my personal performance of the history, physical exam, assessment, and plan.

## 2024-02-05 NOTE — HISTORY OF PRESENT ILLNESS
[FreeTextEntry1] : cc f/u from hospital  63 year old male presents for f/u from hospital. Pt reports he went to the hospital on 01/24/24 for left sided pain. He was told he had kidney stones, given Flomax and pain medication, and sent home. He states his pain was persisting on 01/27/24 and presented at the hospital again once he started vomiting. He reports he passed the stone in the hospital. He states his pain has resolved. Pt had a CT while in the hospital and was told he still has kidney stones, and he has a nodule on bottom left that has significantly changed from 2019. He states he was told to have it evaluated for malignancy.  Pt reports he was in the hospital for kidney stones in 2019.

## 2024-02-16 ENCOUNTER — APPOINTMENT (OUTPATIENT)
Dept: MRI IMAGING | Facility: CLINIC | Age: 64
End: 2024-02-16
Payer: COMMERCIAL

## 2024-02-16 PROCEDURE — 74183 MRI ABD W/O CNTR FLWD CNTR: CPT

## 2024-02-16 PROCEDURE — A9585: CPT

## 2024-03-01 ENCOUNTER — APPOINTMENT (OUTPATIENT)
Dept: UROLOGY | Facility: CLINIC | Age: 64
End: 2024-03-01
